# Patient Record
Sex: MALE | Race: ASIAN | NOT HISPANIC OR LATINO | URBAN - METROPOLITAN AREA
[De-identification: names, ages, dates, MRNs, and addresses within clinical notes are randomized per-mention and may not be internally consistent; named-entity substitution may affect disease eponyms.]

---

## 2018-09-30 ENCOUNTER — OUTPATIENT (OUTPATIENT)
Dept: EMERGENCY DEPT | Facility: HOSPITAL | Age: 63
LOS: 1 days | Discharge: HOME | End: 2018-09-30

## 2018-09-30 VITALS
SYSTOLIC BLOOD PRESSURE: 112 MMHG | OXYGEN SATURATION: 100 % | HEART RATE: 79 BPM | RESPIRATION RATE: 18 BRPM | TEMPERATURE: 98 F | DIASTOLIC BLOOD PRESSURE: 81 MMHG

## 2018-09-30 RX ORDER — ASPIRIN/CALCIUM CARB/MAGNESIUM 324 MG
162 TABLET ORAL ONCE
Qty: 0 | Refills: 0 | Status: COMPLETED | OUTPATIENT
Start: 2018-09-30 | End: 2018-09-30

## 2018-09-30 NOTE — ED ADULT NURSE NOTE - NSIMPLEMENTINTERV_GEN_ALL_ED
Implemented All Universal Safety Interventions:  [place] to call system. Call bell, personal items and telephone within reach. Instruct patient to call for assistance. Room bathroom lighting operational. Non-slip footwear when patient is off stretcher. Physically safe environment: no spills, clutter or unnecessary equipment. Stretcher in lowest position, wheels locked, appropriate side rails in place.

## 2018-09-30 NOTE — ED PROVIDER NOTE - PHYSICAL EXAMINATION
Gen: NAD, AOx3, non-toxic //            Head: NCAT //            HEENT: EOMI, oral mucosa moist, normal conjunctiva //            Lung: CTAB, no respiratory distress, no wheezes/rhonchi/rales B/L, speaking in full sentences. //         CARDIAC: Regular rate and rhythm. Normal S1 and S2. No murmurs, rubs or gallops.  Equal pulses in upper and lower extremities bilaterally. No JVD.  There is no peripheral edema, cyanosis or pallor. Extremities are warm and well perfused. Capillary refill is less than 2 seconds. //            Abd: soft, NTND, no guarding, no CVA tenderness //            MSK: no visible deformities //            Neuro: No focal sensory or motor deficits //            Skin: Warm, well perfused, no rash //            Psych: normal affect. ~Matilde Javier M.D., Ph.D. -Resident

## 2018-09-30 NOTE — ED PROVIDER NOTE - MEDICAL DECISION MAKING DETAILS
patient with chest pain, going to [facility] on Oct 5th, will do ACS workup and admit to CDU vs tele for stress test in AM.

## 2018-09-30 NOTE — ED PROVIDER NOTE - OBJECTIVE STATEMENT
62 male history of HTN, HLD, here for chest pain. Onset 1500, substernal, dull, aching, points to left side, had diaphoresis and lightheadedness later. No nausea/vomiting. No syncope. No shortness of breath. nonsmoker, no drugs, no etoh. Has chest pain now. Last stress test 3 years ago. not better with nitro or ASA.

## 2018-09-30 NOTE — ED ADULT TRIAGE NOTE - CHIEF COMPLAINT QUOTE
PT C/O Left sided CP starting 3:30 pm. Denies SOB, dyspnea. PMH: HTN, HLD. Given  162 ASA and 1 intranasal nitroglycerin spray by EMS en route. 20 G IV to L hand placed by EMS.

## 2018-09-30 NOTE — ED ADULT NURSE NOTE - OBJECTIVE STATEMENT
Yony RN: received pt to room 2 for evaluation of left sided chest pain, non-radiating since 3:30pm with intermittent dizziness. pt requesting to be admitted overnight stating " [facility] I can't wait for an appointment to see a cardiologist". denies any additional symptoms. pt presents awake a&ox4, denies ha. skin warm, dry, appropriate for race. respirations even, unlabored. denies sob, endorses cp. abdomen soft nontender nondistended. denies n/v/d. denies fevers or chills. ivl placed pta by ems. bloods drawn and sent. report to primary RN Dax.

## 2018-09-30 NOTE — ED PROVIDER NOTE - NS ED ROS FT
GENERAL: No fever or chills, //             EYES: no change in vision, //             HEENT: no trouble swallowing or speaking, //                GI: no abdominal pain, no nausea or no vomiting, no diarrhea or constipation, //             : No changes in urination,  //            SKIN: no rashes,  //            NEURO: + headache,  //             MSK: No joint pain otherwise as HPI or negative. ~Matilde Javier M.D., Ph.D. -Resident

## 2018-09-30 NOTE — ED PROVIDER NOTE - ATTENDING CONTRIBUTION TO CARE
Dr. Rodríguez:  I have personally performed a face to face bedside history and physical examination of this patient. I have discussed the history, examination, review of systems, assessment and plan of management with the resident. I have reviewed the electronic medical record and amended it to reflect my history, review of systems, physical exam, assessment and plan.    62M h/o HTN, HLD, presents with c/o CP that started around 3pm while at rest.  Dull ache, with associated diaphoresis and lightheadedness.  Denies fever/chills, sob, n/v/d.  Last stress 3yrs prior.    Exam:  - nad  - rrr  - ctab   -abd soft ntnd    A/P  - CP, concern for ACS  - cbc, cmp, trop, coags  - ekg  - cxr Dr. Rodríguez:  I have personally performed a face to face bedside history and physical examination of this patient. I have discussed the history, examination, review of systems, assessment and plan of management with the resident. I have reviewed the electronic medical record and amended it to reflect my history, review of systems, physical exam, assessment and plan.    62M h/o HTN, HLD, presents with c/o CP that started around 3pm while at rest.  Dull ache, with associated diaphoresis and lightheadedness.  Denies fever/chills, sob, n/v/d.  Last stress 3yrs prior.  Brother  of heart attack at 57yr    Exam:  - nad  - rrr  - ctab   -abd soft ntnd    A/P  - CP, concern for ACS  - cbc, cmp, trop, coags  - ekg  - cxr

## 2018-10-01 VITALS — HEIGHT: 65 IN | WEIGHT: 145.06 LBS

## 2018-10-01 DIAGNOSIS — D17.1 BENIGN LIPOMATOUS NEOPLASM OF SKIN AND SUBCUTANEOUS TISSUE OF TRUNK: Chronic | ICD-10-CM

## 2018-10-01 DIAGNOSIS — E78.5 HYPERLIPIDEMIA, UNSPECIFIED: ICD-10-CM

## 2018-10-01 DIAGNOSIS — I24.9 ACUTE ISCHEMIC HEART DISEASE, UNSPECIFIED: ICD-10-CM

## 2018-10-01 DIAGNOSIS — R07.9 CHEST PAIN, UNSPECIFIED: ICD-10-CM

## 2018-10-01 DIAGNOSIS — I10 ESSENTIAL (PRIMARY) HYPERTENSION: ICD-10-CM

## 2018-10-01 DIAGNOSIS — H26.9 UNSPECIFIED CATARACT: Chronic | ICD-10-CM

## 2018-10-01 DIAGNOSIS — R07.89 OTHER CHEST PAIN: ICD-10-CM

## 2018-10-01 DIAGNOSIS — Z29.9 ENCOUNTER FOR PROPHYLACTIC MEASURES, UNSPECIFIED: ICD-10-CM

## 2018-10-01 LAB
ALBUMIN SERPL ELPH-MCNC: 3.3 G/DL — SIGNIFICANT CHANGE UP (ref 3.3–5)
ALP SERPL-CCNC: 67 U/L — SIGNIFICANT CHANGE UP (ref 40–120)
ALT FLD-CCNC: 23 U/L — SIGNIFICANT CHANGE UP (ref 4–41)
AST SERPL-CCNC: 25 U/L — SIGNIFICANT CHANGE UP (ref 4–40)
BASOPHILS # BLD AUTO: 0.03 K/UL — SIGNIFICANT CHANGE UP (ref 0–0.2)
BASOPHILS NFR BLD AUTO: 0.5 % — SIGNIFICANT CHANGE UP (ref 0–2)
BILIRUB SERPL-MCNC: 0.2 MG/DL — SIGNIFICANT CHANGE UP (ref 0.2–1.2)
BUN SERPL-MCNC: 18 MG/DL — SIGNIFICANT CHANGE UP (ref 7–23)
BUN SERPL-MCNC: 18 MG/DL — SIGNIFICANT CHANGE UP (ref 7–23)
CALCIUM SERPL-MCNC: 7.3 MG/DL — LOW (ref 8.4–10.5)
CALCIUM SERPL-MCNC: 9.2 MG/DL — SIGNIFICANT CHANGE UP (ref 8.4–10.5)
CHLORIDE SERPL-SCNC: 106 MMOL/L — SIGNIFICANT CHANGE UP (ref 98–107)
CHLORIDE SERPL-SCNC: 111 MMOL/L — HIGH (ref 98–107)
CHOLEST SERPL-MCNC: 134 MG/DL — SIGNIFICANT CHANGE UP (ref 120–199)
CO2 SERPL-SCNC: 14 MMOL/L — LOW (ref 22–31)
CO2 SERPL-SCNC: 23 MMOL/L — SIGNIFICANT CHANGE UP (ref 22–31)
CREAT SERPL-MCNC: 0.78 MG/DL — SIGNIFICANT CHANGE UP (ref 0.5–1.3)
CREAT SERPL-MCNC: 0.87 MG/DL — SIGNIFICANT CHANGE UP (ref 0.5–1.3)
EOSINOPHIL # BLD AUTO: 0.19 K/UL — SIGNIFICANT CHANGE UP (ref 0–0.5)
EOSINOPHIL NFR BLD AUTO: 2.9 % — SIGNIFICANT CHANGE UP (ref 0–6)
GLUCOSE SERPL-MCNC: 148 MG/DL — HIGH (ref 70–99)
GLUCOSE SERPL-MCNC: 81 MG/DL — SIGNIFICANT CHANGE UP (ref 70–99)
HBA1C BLD-MCNC: 5.5 % — SIGNIFICANT CHANGE UP (ref 4–5.6)
HCT VFR BLD CALC: 38.7 % — LOW (ref 39–50)
HCT VFR BLD CALC: 42 % — SIGNIFICANT CHANGE UP (ref 39–50)
HDLC SERPL-MCNC: 31 MG/DL — LOW (ref 35–55)
HGB BLD-MCNC: 12.6 G/DL — LOW (ref 13–17)
HGB BLD-MCNC: 14.1 G/DL — SIGNIFICANT CHANGE UP (ref 13–17)
IMM GRANULOCYTES # BLD AUTO: 0.01 # — SIGNIFICANT CHANGE UP
IMM GRANULOCYTES NFR BLD AUTO: 0.2 % — SIGNIFICANT CHANGE UP (ref 0–1.5)
LIPID PNL WITH DIRECT LDL SERPL: 97 MG/DL — SIGNIFICANT CHANGE UP
LYMPHOCYTES # BLD AUTO: 2.43 K/UL — SIGNIFICANT CHANGE UP (ref 1–3.3)
LYMPHOCYTES # BLD AUTO: 36.7 % — SIGNIFICANT CHANGE UP (ref 13–44)
MCHC RBC-ENTMCNC: 28.8 PG — SIGNIFICANT CHANGE UP (ref 27–34)
MCHC RBC-ENTMCNC: 29 PG — SIGNIFICANT CHANGE UP (ref 27–34)
MCHC RBC-ENTMCNC: 32.6 % — SIGNIFICANT CHANGE UP (ref 32–36)
MCHC RBC-ENTMCNC: 33.6 % — SIGNIFICANT CHANGE UP (ref 32–36)
MCV RBC AUTO: 85.7 FL — SIGNIFICANT CHANGE UP (ref 80–100)
MCV RBC AUTO: 89.2 FL — SIGNIFICANT CHANGE UP (ref 80–100)
MONOCYTES # BLD AUTO: 0.52 K/UL — SIGNIFICANT CHANGE UP (ref 0–0.9)
MONOCYTES NFR BLD AUTO: 7.9 % — SIGNIFICANT CHANGE UP (ref 2–14)
NEUTROPHILS # BLD AUTO: 3.44 K/UL — SIGNIFICANT CHANGE UP (ref 1.8–7.4)
NEUTROPHILS NFR BLD AUTO: 51.8 % — SIGNIFICANT CHANGE UP (ref 43–77)
NRBC # FLD: 0 — SIGNIFICANT CHANGE UP
NRBC # FLD: 0 — SIGNIFICANT CHANGE UP
PLATELET # BLD AUTO: 159 K/UL — SIGNIFICANT CHANGE UP (ref 150–400)
PLATELET # BLD AUTO: 56 K/UL — LOW (ref 150–400)
PMV BLD: 12.2 FL — SIGNIFICANT CHANGE UP (ref 7–13)
PMV BLD: 12.3 FL — SIGNIFICANT CHANGE UP (ref 7–13)
POTASSIUM SERPL-MCNC: 3.6 MMOL/L — SIGNIFICANT CHANGE UP (ref 3.5–5.3)
POTASSIUM SERPL-MCNC: 3.7 MMOL/L — SIGNIFICANT CHANGE UP (ref 3.5–5.3)
POTASSIUM SERPL-SCNC: 3.6 MMOL/L — SIGNIFICANT CHANGE UP (ref 3.5–5.3)
POTASSIUM SERPL-SCNC: 3.7 MMOL/L — SIGNIFICANT CHANGE UP (ref 3.5–5.3)
PROT SERPL-MCNC: 6.2 G/DL — SIGNIFICANT CHANGE UP (ref 6–8.3)
RBC # BLD: 4.34 M/UL — SIGNIFICANT CHANGE UP (ref 4.2–5.8)
RBC # BLD: 4.9 M/UL — SIGNIFICANT CHANGE UP (ref 4.2–5.8)
RBC # FLD: 12.5 % — SIGNIFICANT CHANGE UP (ref 10.3–14.5)
RBC # FLD: 12.5 % — SIGNIFICANT CHANGE UP (ref 10.3–14.5)
SODIUM SERPL-SCNC: 139 MMOL/L — SIGNIFICANT CHANGE UP (ref 135–145)
SODIUM SERPL-SCNC: 141 MMOL/L — SIGNIFICANT CHANGE UP (ref 135–145)
TRIGL SERPL-MCNC: 96 MG/DL — SIGNIFICANT CHANGE UP (ref 10–149)
TROPONIN T, HIGH SENSITIVITY: < 6 NG/L — SIGNIFICANT CHANGE UP (ref ?–14)
WBC # BLD: 6.62 K/UL — SIGNIFICANT CHANGE UP (ref 3.8–10.5)
WBC # BLD: 8.47 K/UL — SIGNIFICANT CHANGE UP (ref 3.8–10.5)
WBC # FLD AUTO: 6.62 K/UL — SIGNIFICANT CHANGE UP (ref 3.8–10.5)
WBC # FLD AUTO: 8.47 K/UL — SIGNIFICANT CHANGE UP (ref 3.8–10.5)

## 2018-10-01 RX ORDER — ASPIRIN/CALCIUM CARB/MAGNESIUM 324 MG
1 TABLET ORAL
Qty: 30 | Refills: 0 | OUTPATIENT
Start: 2018-10-01 | End: 2018-10-30

## 2018-10-01 RX ORDER — LOSARTAN POTASSIUM 100 MG/1
50 TABLET, FILM COATED ORAL DAILY
Qty: 0 | Refills: 0 | Status: DISCONTINUED | OUTPATIENT
Start: 2018-10-01 | End: 2018-10-01

## 2018-10-01 RX ORDER — ASPIRIN/CALCIUM CARB/MAGNESIUM 324 MG
81 TABLET ORAL DAILY
Qty: 0 | Refills: 0 | Status: DISCONTINUED | OUTPATIENT
Start: 2018-10-01 | End: 2018-10-01

## 2018-10-01 RX ORDER — ACETAMINOPHEN 500 MG
650 TABLET ORAL ONCE
Qty: 0 | Refills: 0 | Status: COMPLETED | OUTPATIENT
Start: 2018-10-01 | End: 2018-10-01

## 2018-10-01 RX ORDER — INFLUENZA VIRUS VACCINE 15; 15; 15; 15 UG/.5ML; UG/.5ML; UG/.5ML; UG/.5ML
0.5 SUSPENSION INTRAMUSCULAR ONCE
Qty: 0 | Refills: 0 | Status: DISCONTINUED | OUTPATIENT
Start: 2018-10-01 | End: 2018-10-01

## 2018-10-01 RX ORDER — SODIUM CHLORIDE 9 MG/ML
1000 INJECTION INTRAMUSCULAR; INTRAVENOUS; SUBCUTANEOUS
Qty: 0 | Refills: 0 | Status: DISCONTINUED | OUTPATIENT
Start: 2018-10-01 | End: 2018-10-01

## 2018-10-01 RX ORDER — ATORVASTATIN CALCIUM 80 MG/1
10 TABLET, FILM COATED ORAL AT BEDTIME
Qty: 0 | Refills: 0 | Status: DISCONTINUED | OUTPATIENT
Start: 2018-10-01 | End: 2018-10-01

## 2018-10-01 RX ADMIN — Medication 650 MILLIGRAM(S): at 13:52

## 2018-10-01 RX ADMIN — Medication 81 MILLIGRAM(S): at 09:54

## 2018-10-01 RX ADMIN — Medication 650 MILLIGRAM(S): at 14:14

## 2018-10-01 RX ADMIN — SODIUM CHLORIDE 100 MILLILITER(S): 9 INJECTION INTRAMUSCULAR; INTRAVENOUS; SUBCUTANEOUS at 09:40

## 2018-10-01 NOTE — DISCHARGE NOTE ADULT - PATIENT PORTAL LINK FT
You can access the [facility] Patient Portal, offered by [facility], by registering with the following website: [email or web address]

## 2018-10-01 NOTE — H&P ADULT - MUSCULOSKELETAL
details… detailed exam normal strength/no calf tenderness/no joint swelling/no joint erythema/no joint warmth/ROM intact

## 2018-10-01 NOTE — H&P ADULT - ASSESSMENT
63 y/o M with PMHx of HTN and HLD presents to the ED with chest pain found to have troponin <6 admitted to telemetry for cardiac monitoring and w/u. 62 male history of HTN, HLD, here for left sided chest pain with some diaphoresis and lightheadedness during praying, unrelieved w/ ASA+Nitro admitted to tele for atypical chest pain, r/o ACS.

## 2018-10-01 NOTE — H&P ADULT - FAMILY HISTORY
Father  Still living? Unknown  Family history of heart attack, Age at diagnosis: Age Unknown     Mother  Still living? Unknown  Family history of heart attack, Age at diagnosis: Age Unknown     Sibling  Still living? Yes, Estimated age: Age Unknown  Family history of heart attack, Age at diagnosis: Age Unknown Father  Still living? Unknown  Family history of heart attack, Age at diagnosis: Age Unknown  Family history of hyperlipidemia, Age at diagnosis: Age Unknown     Mother  Still living? Unknown  Family history of heart attack, Age at diagnosis: Age Unknown  Family history of hyperlipidemia, Age at diagnosis: Age Unknown     Sibling  Still living? Yes, Estimated age: Age Unknown  Family history of heart attack, Age at diagnosis: Age Unknown  Family history of hyperlipidemia, Age at diagnosis: Age Unknown

## 2018-10-01 NOTE — H&P ADULT - RS GEN PE MLT RESP DETAILS PC
no chest wall tenderness/no rhonchi/no wheezes/respirations non-labored/breath sounds equal/good air movement/clear to auscultation bilaterally/airway patent/no rales

## 2018-10-01 NOTE — DISCHARGE NOTE ADULT - PLAN OF CARE
Continue with your blood pressure medications; eat a heart healthy diet with low salt diet; exercise regularly (consult with your physician or cardiologist first); maintain a heart healthy weight; if you smoke - quit (A include healthy ways to manage stress. Continue to follow with your primary care physician or cardiologist. You will be free of chest pain or shortness of breath. No heavy lifting or pushing/pulling or strenuous activity with procedure arm for 2 weeks. No driving for 2 days. No sex for 1 week.  You may shower 24 hours following procedure but no soaking of your wrist in water (such as in a pool, sink, or tub) for 1 week. Check wrist site for bleeding and/or swelling daily following procedure. Call your doctor/cardiologist immediately for bleeding or swelling or if you have increased/persistent pain or drainage at the wrist site or if you have numbness, tingling or blue or white coloring of your hand or fingers.  Follow up with your cardiologist in 1- 2 weeks. Your blood pressure will be controlled.

## 2018-10-01 NOTE — H&P ADULT - NEGATIVE GENERAL SYMPTOMS
no sweating/no fever/no chills/no polydipsia/no weight loss/no weight gain/no polyuria no chills/no weight loss/no weight gain/no polyuria/no polydipsia/no fever

## 2018-10-01 NOTE — DISCHARGE NOTE ADULT - HOSPITAL COURSE
63 y/o M with PMHx of HTN and HLD presents to the ED with chest pain. Yesterday 9/30/18 at ~1500, pt c/o dull, constant, non-radiating, 5/10, L-sided chest pain at rest. Pt took a nap, woke up to pray and chest pain was still constant accompanied with dizziness, lightheadedness, diaphoresis, and nausea. Pt took 4 tablets of ASA 81mg without any relief, no exacerbating factors, not reproducible, not changed with diet or position, called 911 and was brought to the ED where he continued to have chest pain. The patient underwent cath which revealed only mild disease. His procedure was performed transradially and he was recovered in cath lab recovery with no issues. The patient was approved for discharge by Dr. Ferrari and Dr. Marie.

## 2018-10-01 NOTE — H&P ADULT - NSHPLABSRESULTS_GEN_ALL_CORE
Trop: <6  CXR: Clear lungs  EKG: NSR @71bpm with RBBB Trop: <6  CXR: Clear lungs  EKG: NSR @71bpm with incomplete RBBB

## 2018-10-01 NOTE — H&P ADULT - HISTORY OF PRESENT ILLNESS
63 y/o M with PMHx of HTN and HLD presents to the ED with chest pain. Yesterday 9/30/18 at ~1500, pt c/o dull, constant, non-radiating, 5/10, L-sided chest pain at rest. Pt took a nap, woke up to pray and chest pain was still constant accompanied with dizziness, diaphoresis, and nausea. Pt took 4 tablets of ASA 81mg without any relief, no exacerbating factors, not reproducible, not changed with diet or position, called 911 and was brought to the ED where he continued to have chest pain. Pt had never felt this way before. Pt reports that he had 2 "routine" stress test done within the past 6 years, results were normal. Pt denies palpitations, SOB, changes in vision, peripheral edema 63 y/o M with PMHx of HTN and HLD presents to the ED with chest pain. Yesterday 9/30/18 at ~1500, pt c/o dull, constant, non-radiating, 5/10, L-sided chest pain at rest. Pt took a nap, woke up to pray and chest pain was still constant accompanied with dizziness, diaphoresis, and nausea. Pt took 4 tablets of ASA 81mg without any relief, no exacerbating factors, not reproducible, not changed with diet or position, called 911 and was brought to the ED where he continued to have chest pain. Pt had never felt this way before. Pt reports that he had 2 "routine" stress test done within the past 6 years, results were normal. Now chest pain is tender, L sided, 0-1/10. Pt denies palpitations, SOB, changes in vision, peripheral edema, vomiting, fever, syncope. 63 y/o M with PMHx of HTN and HLD presents to the ED with chest pain. Yesterday 9/30/18 at ~1500, pt c/o dull, constant, non-radiating, 5/10, L-sided chest pain at rest. Pt took a nap, woke up to pray and chest pain was still constant accompanied with dizziness, diaphoresis, and nausea. Pt took 4 tablets of ASA 81mg without any relief, no exacerbating factors, not reproducible, not changed with diet or position, called 911 and was brought to the ED where he continued to have chest pain. Pt had never felt this way before. Pt reports that he had 2 "routine" stress test done within the past 6 years, with the last done ~3 yrs ago, results were normal. Now chest pain is tender, L sided, 0-1/10. Pt denies palpitations, SOB, changes in vision, peripheral edema, vomiting, fever, syncope. 63 y/o M with PMHx of HTN and HLD presents to the ED with chest pain. Yesterday 9/30/18 at ~1500, pt c/o dull, constant, non-radiating, 5/10, L-sided chest pain at rest. Pt took a nap, woke up to pray and chest pain was still constant accompanied with dizziness, lightheadedness, diaphoresis, and nausea. Pt took 4 tablets of ASA 81mg without any relief, no exacerbating factors, not reproducible, not changed with diet or position, called 911 and was brought to the ED where he continued to have chest pain. Pt had never felt this way before. Pt usually active and prays everyday without these symptoms. Pt reports that he had 2 "routine" stress test done within the past 6 years, with the last done ~3 yrs ago, results were normal. Now chest pain is tender, L sided, 0-1/10. Pt denies palpitations, SOB, changes in vision, peripheral edema, vomiting, fever, syncope.

## 2018-10-01 NOTE — H&P ADULT - PROBLEM SELECTOR PLAN 1
Admit to telemetry for cardiac monitoring and w/u   Continue home med Losartan   Start ASA   Serial EKGs Admit to telemetry for cardiac monitoring and w/u   Continue home med Losartan   Start ASA   Serial EKGs  Stress test Admit to telemetry for cardiac monitoring and w/u   Continue home med Losartan and Atorvastatin  Start ASA   Serial EKGs  NPO for Cardiac Cath  Also, pt appears mildly hypovolemic-->started gentle IV fluids hydration w/ NS @ 100c/hr

## 2018-10-01 NOTE — DISCHARGE NOTE ADULT - CARE PLAN
Principal Discharge DX:	Unstable angina  Goal:	You will be free of chest pain or shortness of breath.  Assessment and plan of treatment:	No heavy lifting or pushing/pulling or strenuous activity with procedure arm for 2 weeks. No driving for 2 days. No sex for 1 week.  You may shower 24 hours following procedure but no soaking of your wrist in water (such as in a pool, sink, or tub) for 1 week. Check wrist site for bleeding and/or swelling daily following procedure. Call your doctor/cardiologist immediately for bleeding or swelling or if you have increased/persistent pain or drainage at the wrist site or if you have numbness, tingling or blue or white coloring of your hand or fingers.  Follow up with your cardiologist in 1- 2 weeks.  Secondary Diagnosis:	Essential hypertension  Goal:	Your blood pressure will be controlled.  Assessment and plan of treatment:	Continue with your blood pressure medications; eat a heart healthy diet with low salt diet; exercise regularly (consult with your physician or cardiologist first); maintain a heart healthy weight; if you smoke - quit (A include healthy ways to manage stress. Continue to follow with your primary care physician or cardiologist.

## 2018-10-01 NOTE — DISCHARGE NOTE ADULT - CARE PROVIDER_API CALL
Duglas Ferrari), Medicine  Dept Director  [address] [place], [place] [address]  Phone: [phone]  Fax: [phone]

## 2018-10-01 NOTE — H&P ADULT - NSHPSOCIALHISTORY_GEN_ALL_CORE
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 Never drinks alcohol/smoke/use illicit drugs.

## 2018-10-01 NOTE — DISCHARGE NOTE ADULT - MEDICATION SUMMARY - MEDICATIONS TO TAKE
I will START or STAY ON the medications listed below when I get home from the hospital:    losartan 50 mg oral tablet  -- 1 tab(s) by mouth once a day  -- Indication: For High blood pressure    atorvastatin 10 mg oral tablet  -- 1 tab(s) by mouth once a day  -- Indication: For High cholesterol

## 2018-10-09 RX ORDER — LOSARTAN POTASSIUM 100 MG/1
1 TABLET, FILM COATED ORAL
Qty: 0 | Refills: 0 | COMMUNITY

## 2018-10-09 RX ORDER — ATORVASTATIN CALCIUM 80 MG/1
1 TABLET, FILM COATED ORAL
Qty: 0 | Refills: 0 | COMMUNITY

## 2019-01-09 NOTE — H&P ADULT - NEGATIVE OPHTHALMOLOGIC SYMPTOMS
no blurred vision R/no pain R/no loss of vision L/no pain L/no loss of vision R/no blurred vision L
none

## 2019-08-08 NOTE — DISCHARGE NOTE ADULT - SECONDARY DIAGNOSIS.
Problem: Fall Risk (Adult)  Goal: Identify Related Risk Factors and Signs and Symptoms  Outcome: Outcome(s) achieved Date Met: 08/08/19 08/08/19 1813   Fall Risk (Adult)   Related Risk Factors (Fall Risk) confusion/agitation;history of falls;slippery/uneven surfaces;environment unfamiliar   Signs and Symptoms (Fall Risk) presence of risk factors     Goal: Absence of Fall  Outcome: Ongoing (interventions implemented as appropriate)   08/08/19 1813   Fall Risk (Adult)   Absence of Fall making progress toward outcome       Problem: Skin Injury Risk (Adult)  Goal: Identify Related Risk Factors and Signs and Symptoms  Outcome: Outcome(s) achieved Date Met: 08/08/19 08/08/19 1813   Skin Injury Risk (Adult)   Related Risk Factors (Skin Injury Risk) critical care admission     Goal: Skin Health and Integrity  Outcome: Ongoing (interventions implemented as appropriate)   08/08/19 1813   Skin Injury Risk (Adult)   Skin Health and Integrity making progress toward outcome       Problem: Patient Care Overview  Goal: Plan of Care Review  Outcome: Ongoing (interventions implemented as appropriate)   08/08/19 1813   Coping/Psychosocial   Plan of Care Reviewed With patient   OTHER   Outcome Summary Assumed care of pt @ 1600 from ED. VSS. pt tolerating bipap. No c/o pain. Will continue to monitor per orders.        Problem: Breathing Pattern Ineffective (Adult)  Goal: Identify Related Risk Factors and Signs and Symptoms  Outcome: Outcome(s) achieved Date Met: 08/08/19 08/08/19 1813   Breathing Pattern Ineffective (Adult)   Related Risk Factors (Breathing Pattern Ineffective) underlying condition   Signs and Symptoms (Breathing Pattern Ineffective) accessory muscle use;anxiousness;breath sounds abnormal;breathing pattern altered;breathlessness;cough ineffective     Goal: Effective Oxygenation/Ventilation  Outcome: Ongoing (interventions implemented as appropriate)   08/08/19 1813   Breathing Pattern Ineffective (Adult)    Effective Oxygenation/Ventilation making progress toward outcome     Goal: Anxiety/Fear Reduction  Outcome: Ongoing (interventions implemented as appropriate)   08/08/19 1813   Breathing Pattern Ineffective (Adult)   Anxiety/Fear Reduction making progress toward outcome          Essential hypertension

## 2022-12-27 NOTE — H&P ADULT - PMH
HTN (hypertension)    Hyperlipidemia Cartilage Graft Text: The defect edges were debeveled with a #15c scalpel blade.  Given the location of the defect, shape of the defect, the fact the defect involved a full thickness cartilage defect a cartilage graft was deemed most appropriate.  An appropriate donor site was identified, cleansed, and anesthetized. The cartilage graft was then harvested and transferred to the recipient site, oriented appropriately and then sutured into place.  The secondary defect was then repaired using a primary closure.

## 2025-02-27 NOTE — DISCHARGE NOTE ADULT - CONTRAINDICATIONS & PRECAUTIONS (SELECT ALL THAT APPLY)
Detail Level: Detailed Quality 226: Preventive Care And Screening: Tobacco Use: Screening And Cessation Intervention: Patient screened for tobacco use and is an ex/non-smoker Quality 47: Advance Care Plan: Advance Care Planning discussed and documented; advance care plan or surrogate decision maker documented in the medical record. Quality 358: Patient-Centered Surgical Risk Assessment And Communication: Documentation of patient-specific risk assessment with a risk calculator based on multi-institutional clinical data, the specific risk calculator used, and communication of risk assessment from risk calculator with the patient or family. Quality 130: Documentation Of Current Medications In The Medical Record: Current Medications Documented Patient/surrogate refused vaccine...